# Patient Record
Sex: FEMALE | Race: AMERICAN INDIAN OR ALASKA NATIVE
[De-identification: names, ages, dates, MRNs, and addresses within clinical notes are randomized per-mention and may not be internally consistent; named-entity substitution may affect disease eponyms.]

---

## 2019-07-19 ENCOUNTER — HOSPITAL ENCOUNTER (OUTPATIENT)
Dept: HOSPITAL 5 - MAMMO | Age: 39
Discharge: HOME | End: 2019-07-19
Attending: OBSTETRICS & GYNECOLOGY
Payer: COMMERCIAL

## 2019-07-19 DIAGNOSIS — Z12.31: Primary | ICD-10-CM

## 2019-07-19 PROCEDURE — 77067 SCR MAMMO BI INCL CAD: CPT

## 2019-07-22 NOTE — MAMMOGRAPHY REPORT
BILATERAL DIGITAL SCREENING MAMMOGRAMS WITH CAD



INDICATION:  Screening.  



COMPARISONS:  None available. However, she indicated that she had had a prior mammogram from [place].



FINDINGS: Craniocaudal and mediolateral oblique views of both breasts were obtained using 2-D digital
 acquisition.

In addition to standard review, the examination was analyzed for possible abnormalities using a compu
ter-assisted detection device (iCAD).



The breast tissue is heterogeneously dense, which may obscure small masses.



A right asymmetry on the MLO view requires comparison with the prior mammogram or additional imaging.
 The left breast is negative.







IMPRESSION:  

Comparison with the previous mammogram is required. We will attempt to obtain a prior mammogram for c
omparison. If we did not obtain a prior mammogram within 30 days, a revised report will be issued rec
ommending a recall for additional imaging. Please be advised that the patient should not schedule an 
appointment for return until adequate time (at least 2 weeks) has passed breast to obtain the prior m
ammogram.



BI-RADS CATEGORY 0:  INCOMPLETE - NEED ADDITIONAL IMAGING EVALUATION AND/OR PRIOR MAMMOGRAMS FOR COMP
ARISON







Information is entered into a reminder system for a target due date for the next mammogram. The resul
ts and recommendations were sent to the patient by mail.



Signer Name: Gary Gallegos MD 

Signed: 7/22/2019 9:15 AM

 Workstation Name: [id number]

## 2022-04-28 ENCOUNTER — HOSPITAL ENCOUNTER (OUTPATIENT)
Dept: HOSPITAL 5 - MAMMO | Age: 42
Discharge: HOME | End: 2022-04-28
Attending: OBSTETRICS & GYNECOLOGY
Payer: COMMERCIAL

## 2022-04-28 DIAGNOSIS — N60.01: Primary | ICD-10-CM

## 2022-04-28 DIAGNOSIS — R92.2: ICD-10-CM

## 2022-04-28 DIAGNOSIS — N63.11: ICD-10-CM

## 2022-04-28 PROCEDURE — 77066 DX MAMMO INCL CAD BI: CPT

## 2022-04-28 NOTE — MAMMOGRAPHY REPORT
BILATERAL DIGITAL DIAGNOSTIC MAMMOGRAM WITH CAD CONVENTIONAL, 4/28/2022

RIGHT LIMITED BREAST ULTRASOUND

 

CLINICAL INFORMATION / INDICATION: Palpable abnormality in the upper outer quadrant of the right jaymie
st.



TECHNIQUE: Digital bilateral mammographic imaging was performed. Limited ultrasound was performed. Th
is examination was interpreted with the benefit of Computer-Aided Detection (CAD) analysis. 



COMPARISON: 7/19/2019, 10/24/2016



FINDINGS: 



Breast Density: The breasts are heterogeneously dense, which may obscure small masses.



MAMMOGRAPHIC FINDINGS: No dominant mass, suspicious calcifications, or architectural distortion in ei
ther breast.



ULTRASOUND FINDINGS: Targeted ultrasound evaluation was performed of the area of interest.   There is
 a 5 x 3 x 7 mm cyst in the 10:00 position of the right breast. No suspicious sonographic abnormaliti
es are seen.





IMPRESSION: No mammographic or sonographic evidence of malignancy.



Follow up recommendation: Routine yearly. Clinical correlation is recommended regarding the area of p
alpable concern.



BI-RADS Category 2:  BENIGN.



-------------------------------------------------------------------------------------------

A "NORMAL" OR NEGATIVE REPORT SHOULD NOT DISCOURAGE FOLLOW UP OR BIOPSY OF A CLINICALLY SIGNIFICANT F
INDING.



A written summary of these findings will be mailed to the patient. The patient will be entered into a
 mammography reporting system which will generate a reminder letter for the patient's next appointmen
t at the appropriate interval.



According to the [employer], yearly mammograms are recommended starting at age 40 
and continuing as long as a woman is in good health.  Breast MRI is recommended for women with an leno
roximately 20-25% or greater lifetime risk of breast cancer, including women with a strong family his
tory of breast or ovarian cancer and women who have been treated for Hodgkin's disease.



Signer Name: Roe Chester MD 

Signed: 4/28/2022 10:15 AM

Workstation Name: [id number]

## 2022-04-28 NOTE — ULTRASOUND REPORT
BILATERAL DIGITAL DIAGNOSTIC MAMMOGRAM WITH CAD CONVENTIONAL, 4/28/2022

RIGHT LIMITED BREAST ULTRASOUND

 

CLINICAL INFORMATION / INDICATION: Palpable abnormality in the upper outer quadrant of the right jaymie
st.



TECHNIQUE: Digital bilateral mammographic imaging was performed. Limited ultrasound was performed. Th
is examination was interpreted with the benefit of Computer-Aided Detection (CAD) analysis. 



COMPARISON: 7/19/2019, 10/24/2016



FINDINGS: 



Breast Density: The breasts are heterogeneously dense, which may obscure small masses.



MAMMOGRAPHIC FINDINGS: No dominant mass, suspicious calcifications, or architectural distortion in ei
ther breast.



ULTRASOUND FINDINGS: Targeted ultrasound evaluation was performed of the area of interest.   There is
 a 5 x 3 x 7 mm cyst in the 10:00 position of the right breast. No suspicious sonographic abnormaliti
es are seen.





IMPRESSION: No mammographic or sonographic evidence of malignancy.



Follow up recommendation: Routine yearly. Clinical correlation is recommended regarding the area of p
alpable concern.



BI-RADS Category 2:  BENIGN.



-------------------------------------------------------------------------------------------

A "NORMAL" OR NEGATIVE REPORT SHOULD NOT DISCOURAGE FOLLOW UP OR BIOPSY OF A CLINICALLY SIGNIFICANT F
INDING.



A written summary of these findings will be mailed to the patient. The patient will be entered into a
 mammography reporting system which will generate a reminder letter for the patient's next appointmen
t at the appropriate interval.



According to the [employer], yearly mammograms are recommended starting at age 40 
and continuing as long as a woman is in good health.  Breast MRI is recommended for women with an leno
roximately 20-25% or greater lifetime risk of breast cancer, including women with a strong family his
tory of breast or ovarian cancer and women who have been treated for Hodgkin's disease.



Signer Name: Roe Chester MD 

Signed: 4/28/2022 10:15 AM

Workstation Name: [email or web address]